# Patient Record
Sex: MALE | Race: WHITE
[De-identification: names, ages, dates, MRNs, and addresses within clinical notes are randomized per-mention and may not be internally consistent; named-entity substitution may affect disease eponyms.]

---

## 2019-03-14 ENCOUNTER — TRANSCRIPTION ENCOUNTER (OUTPATIENT)
Age: 34
End: 2019-03-14

## 2019-11-26 ENCOUNTER — TRANSCRIPTION ENCOUNTER (OUTPATIENT)
Age: 34
End: 2019-11-26

## 2022-08-23 ENCOUNTER — APPOINTMENT (OUTPATIENT)
Age: 37
End: 2022-08-23
Payer: COMMERCIAL

## 2022-08-23 ENCOUNTER — APPOINTMENT (OUTPATIENT)
Dept: BARIATRICS | Facility: CLINIC | Age: 37
End: 2022-08-23

## 2022-08-23 VITALS
TEMPERATURE: 98.5 F | WEIGHT: 235 LBS | HEIGHT: 70 IN | BODY MASS INDEX: 33.64 KG/M2 | DIASTOLIC BLOOD PRESSURE: 89 MMHG | OXYGEN SATURATION: 95 % | HEART RATE: 118 BPM | SYSTOLIC BLOOD PRESSURE: 137 MMHG

## 2022-08-23 DIAGNOSIS — Z78.9 OTHER SPECIFIED HEALTH STATUS: ICD-10-CM

## 2022-08-23 PROBLEM — Z00.00 ENCOUNTER FOR PREVENTIVE HEALTH EXAMINATION: Status: ACTIVE | Noted: 2022-08-23

## 2022-08-23 PROCEDURE — 99204 OFFICE O/P NEW MOD 45 MIN: CPT

## 2022-08-23 PROCEDURE — G0447 BEHAVIOR COUNSEL OBESITY 15M: CPT | Mod: 59

## 2022-08-23 PROCEDURE — 36415 COLL VENOUS BLD VENIPUNCTURE: CPT

## 2022-08-23 PROCEDURE — XXXXX: CPT | Mod: 1L

## 2022-08-24 PROBLEM — Z78.9 NON-SMOKER: Status: ACTIVE | Noted: 2022-08-24

## 2022-08-24 PROBLEM — Z78.9 SOCIAL ALCOHOL USE: Status: ACTIVE | Noted: 2022-08-24

## 2022-08-24 NOTE — ASSESSMENT
[FreeTextEntry1] : Patient with BMI of 33.72. We discussed the importance of weight loss in the management of his comorbidities. We discussed the risks of continued excess weight on long term health. He met with the RD to discuss dietary changes he can make. We discussed ways he can incorporate exercise into his daily routine. \par Will complete labs to r/o secondary causes of weight gain. Pending labs will discuss the use of medication to help supplement lifestyle modification efforts. \par At least 15 minutes was spent during the visit on obesity counseling. \par \par Will wear Apple watch to monitor HR at home. \par \par labs done today in the office, will call with results. \par \par f/u in 4-6 weeks

## 2022-08-24 NOTE — HISTORY OF PRESENT ILLNESS
[Every night] : I use a CPAP machine every night [I usually sleep 6-8 hours] : I usually sleep 6-8 hours [Improved Health] : Improved health [Improved Looks/Aesthetics] : Improved looks/aesthetics [Young Adult] : yound adult [Cut/Track Calories] : cut/track calories [Time management] : time management [Portions/overeating] : portions/overeating [Accountability (having someone/group to report to)] : accountability (having someone/group to report to) [Other: (explain) _____] : [unfilled] [2] : 2 [I snore] : I snore [My sleep partner tells me I stop breathing when I sleep] : My sleep partner tells me I stop breathing when I sleep [2+ miles] : Walking distance capability: 2+ miles [Other: ___] : [unfilled] [0] : 0 [None] : none [BILL] : BILL [Family/friends] : family/friends [FreeTextEntry2] : 165-170 [FreeTextEntry3] : 235 [] : No [FreeTextEntry1] : 36 year old male presents for evaluation of weight gain and obesity. He states that he was generally healthy but began to struggle with his weight in his mid to late 20s. He is currently at his highest weight. At the age of 27 he was about 160-170lbs and felt his best. He feels that in the past when he has tried to lose weight by dieting and exercising, he would become frustrated if he didn't see the weight coming down and would just give up. Currently he eats out most of the week due to his job and social obligations. When he is out to dinner, he also drinks alcohol. He does not have time to exercise. He would like to lose weight to feel better.\par He was diagnosed with BILL and has a CPAP machine he uses every night. He states if he doesn't use the machine he will wake up feeling tired. \par His HR is somewhat elevated today. On repeat was in the 90s. He feels well and denies any chest pain, palpitations or shortness of breath.

## 2022-08-29 LAB
25(OH)D3 SERPL-MCNC: 41.6 NG/ML
ALBUMIN SERPL ELPH-MCNC: 4.8 G/DL
ALP BLD-CCNC: 85 U/L
ALT SERPL-CCNC: 58 U/L
ANION GAP SERPL CALC-SCNC: 17 MMOL/L
AST SERPL-CCNC: 29 U/L
BASOPHILS # BLD AUTO: 0.05 K/UL
BASOPHILS NFR BLD AUTO: 0.5 %
BILIRUB SERPL-MCNC: 0.5 MG/DL
BUN SERPL-MCNC: 15 MG/DL
CALCIUM SERPL-MCNC: 9.7 MG/DL
CHLORIDE SERPL-SCNC: 101 MMOL/L
CHOLEST SERPL-MCNC: 213 MG/DL
CO2 SERPL-SCNC: 24 MMOL/L
CREAT SERPL-MCNC: 0.98 MG/DL
EGFR: 104 ML/MIN/1.73M2
EOSINOPHIL # BLD AUTO: 0.08 K/UL
EOSINOPHIL NFR BLD AUTO: 0.8 %
ESTIMATED AVERAGE GLUCOSE: 108 MG/DL
FOLATE SERPL-MCNC: 6.6 NG/ML
GLUCOSE SERPL-MCNC: 106 MG/DL
HBA1C MFR BLD HPLC: 5.4 %
HCT VFR BLD CALC: 50.5 %
HDLC SERPL-MCNC: 47 MG/DL
HGB BLD-MCNC: 16.9 G/DL
IMM GRANULOCYTES NFR BLD AUTO: 0.9 %
INSULIN P FAST SERPL-ACNC: 28.9 UU/ML
LDLC SERPL CALC-MCNC: NORMAL MG/DL
LYMPHOCYTES # BLD AUTO: 1.93 K/UL
LYMPHOCYTES NFR BLD AUTO: 18.6 %
MAN DIFF?: NORMAL
MCHC RBC-ENTMCNC: 31.7 PG
MCHC RBC-ENTMCNC: 33.5 GM/DL
MCV RBC AUTO: 94.7 FL
MONOCYTES # BLD AUTO: 0.62 K/UL
MONOCYTES NFR BLD AUTO: 6 %
NEUTROPHILS # BLD AUTO: 7.63 K/UL
NEUTROPHILS NFR BLD AUTO: 73.2 %
NONHDLC SERPL-MCNC: 166 MG/DL
PLATELET # BLD AUTO: 249 K/UL
POTASSIUM SERPL-SCNC: 4.1 MMOL/L
PROT SERPL-MCNC: 7.6 G/DL
RBC # BLD: 5.33 M/UL
RBC # FLD: 15.1 %
SODIUM SERPL-SCNC: 142 MMOL/L
T4 FREE SERPL-MCNC: 1.3 NG/DL
T4 SERPL-MCNC: 7.7 UG/DL
TRIGL SERPL-MCNC: 408 MG/DL
TSH SERPL-ACNC: 2.09 UIU/ML
VIT B12 SERPL-MCNC: 833 PG/ML
WBC # FLD AUTO: 10.4 K/UL

## 2022-09-20 ENCOUNTER — APPOINTMENT (OUTPATIENT)
Dept: BARIATRICS | Facility: CLINIC | Age: 37
End: 2022-09-20

## 2022-09-20 VITALS
TEMPERATURE: 98.2 F | BODY MASS INDEX: 32.35 KG/M2 | SYSTOLIC BLOOD PRESSURE: 127 MMHG | WEIGHT: 226 LBS | OXYGEN SATURATION: 98 % | DIASTOLIC BLOOD PRESSURE: 77 MMHG | HEART RATE: 78 BPM | HEIGHT: 70 IN

## 2022-09-20 PROCEDURE — 36415 COLL VENOUS BLD VENIPUNCTURE: CPT

## 2022-09-20 PROCEDURE — G0447 BEHAVIOR COUNSEL OBESITY 15M: CPT | Mod: 59

## 2022-09-20 PROCEDURE — 99214 OFFICE O/P EST MOD 30 MIN: CPT

## 2022-09-21 NOTE — ASSESSMENT
[FreeTextEntry1] : Patient with BMI of 32.43. Positive reinforcement offered for patients efforts and success. He will continue to focus on lifestyle modifications. Fasting labs done today in the office. Pending labs consider adding GLP1 to help with metabolic syndrome. \par He met with the RD today (see attached notes). \par At least 15 minutes was spent during the visit on obesity counseling. \par \par Will continue to monitor BP and HR.\par \par Labs done to check Lipid panel due to Trigs >400. \par \par f/u in 6 weeks.

## 2022-09-21 NOTE — HISTORY OF PRESENT ILLNESS
[FreeTextEntry1] : 36 year old male presents for follow up of weight gain and obesity. He states that he was generally healthy but began to struggle with his weight in his mid to late 20s. He is currently at his highest weight. At the age of 27 he was about 160-170lbs and felt his best. He feels that in the past when he has tried to lose weight by dieting and exercising, he would become frustrated if he didn't see the weight coming down and would just give up. Currently he eats out most of the week due to his job and social obligations. When he is out to dinner, he also drinks alcohol. He does not have time to exercise. He would like to lose weight to feel better.\par He was diagnosed with BILL and has a CPAP machine he uses every night. He states if he doesn't use the machine he will wake up feeling tired. \par \par Since his last visit, he has made significant changes to his diet. He has cut down on alcohol. He is exercising now.  He is now taking Metformin 500 mg BID to help with IFG and Insulin resistance. He is also taking Omega 3 fish oil for hypertriglyceridemia. Mr. Painter has lost 9 lbs since last visit. \par BP and HR are much improved today.

## 2022-09-22 LAB
ALBUMIN SERPL ELPH-MCNC: 4.5 G/DL
ALP BLD-CCNC: 60 U/L
ALT SERPL-CCNC: 42 U/L
ANION GAP SERPL CALC-SCNC: 13 MMOL/L
AST SERPL-CCNC: 29 U/L
BILIRUB SERPL-MCNC: 0.5 MG/DL
BUN SERPL-MCNC: 15 MG/DL
CALCIUM SERPL-MCNC: 9.3 MG/DL
CHLORIDE SERPL-SCNC: 103 MMOL/L
CHOLEST SERPL-MCNC: 205 MG/DL
CO2 SERPL-SCNC: 24 MMOL/L
CREAT SERPL-MCNC: 1 MG/DL
EGFR: 101 ML/MIN/1.73M2
GLUCOSE SERPL-MCNC: 84 MG/DL
HDLC SERPL-MCNC: 40 MG/DL
INSULIN P FAST SERPL-ACNC: 5.9 UU/ML
LDLC SERPL CALC-MCNC: 141 MG/DL
LDLC SERPL DIRECT ASSAY-MCNC: 134 MG/DL
NONHDLC SERPL-MCNC: 166 MG/DL
POTASSIUM SERPL-SCNC: 4.2 MMOL/L
PROT SERPL-MCNC: 7.1 G/DL
SODIUM SERPL-SCNC: 140 MMOL/L
TRIGL SERPL-MCNC: 125 MG/DL

## 2022-11-01 ENCOUNTER — APPOINTMENT (OUTPATIENT)
Dept: BARIATRICS | Facility: CLINIC | Age: 37
End: 2022-11-01

## 2022-11-01 VITALS
DIASTOLIC BLOOD PRESSURE: 87 MMHG | HEART RATE: 76 BPM | HEIGHT: 70 IN | SYSTOLIC BLOOD PRESSURE: 129 MMHG | OXYGEN SATURATION: 98 % | TEMPERATURE: 98.2 F | BODY MASS INDEX: 29.92 KG/M2 | WEIGHT: 209 LBS

## 2022-11-01 PROCEDURE — G0447 BEHAVIOR COUNSEL OBESITY 15M: CPT | Mod: 59

## 2022-11-01 PROCEDURE — 99213 OFFICE O/P EST LOW 20 MIN: CPT

## 2022-11-01 NOTE — HISTORY OF PRESENT ILLNESS
[FreeTextEntry1] : 36 year old male presents for follow up of weight gain and obesity. He states that he was generally healthy but began to struggle with his weight in his mid to late 20s. He is currently at his highest weight. At the age of 27 he was about 160-170lbs and felt his best. He feels that in the past when he has tried to lose weight by dieting and exercising, he would become frustrated if he didn't see the weight coming down and would just give up. Currently he eats out most of the week due to his job and social obligations. When he is out to dinner, he also drinks alcohol. He does not have time to exercise. He would like to lose weight to feel better.\par He was diagnosed with BILL and has a CPAP machine he uses every night. He states if he doesn't use the machine he will wake up feeling tired. \par \par Since his last visit, he has made significant changes to his diet. He has cut down on alcohol. He is exercising now.  He is now taking Metformin 500 mg BID to help with IFG and Insulin resistance. And recently started on Mounjaro 2.5 mg weekly. Feels well and denies any GI side effects. He is also taking Omega 3 fish oil for hypertriglyceridemia. Mr. Painter has lost 17 lbs since last visit. Has lost a total of 26 lbs.\par BP and HR are much improved today.

## 2022-11-01 NOTE — ASSESSMENT
[FreeTextEntry1] : Patient with improving BMI. Positive reinforcement offered for patients efforts and success. He will continue to focus on lifestyle modifications. He will continue the same medications. Discussed that weight loss may start to slow down. Discussed importance of making sure he also includes resistance training to prevent muscle loss.\par \par At least 15 minutes was spent during the visit on obesity counseling. \par \par Will continue to monitor BP and HR.\par \par Patient will complete fasting labs prior to f/u in 2 months\par \par Will call for any issues.

## 2022-12-27 ENCOUNTER — RX RENEWAL (OUTPATIENT)
Age: 37
End: 2022-12-27

## 2023-01-13 ENCOUNTER — APPOINTMENT (OUTPATIENT)
Dept: BARIATRICS | Facility: CLINIC | Age: 38
End: 2023-01-13
Payer: COMMERCIAL

## 2023-01-13 VITALS
SYSTOLIC BLOOD PRESSURE: 124 MMHG | BODY MASS INDEX: 27.77 KG/M2 | DIASTOLIC BLOOD PRESSURE: 79 MMHG | HEIGHT: 70 IN | HEART RATE: 74 BPM | WEIGHT: 194 LBS | TEMPERATURE: 97.6 F | OXYGEN SATURATION: 99 %

## 2023-01-13 DIAGNOSIS — E78.2 MIXED HYPERLIPIDEMIA: ICD-10-CM

## 2023-01-13 PROCEDURE — 99214 OFFICE O/P EST MOD 30 MIN: CPT

## 2023-01-13 PROCEDURE — G0447 BEHAVIOR COUNSEL OBESITY 15M: CPT | Mod: 59

## 2023-01-13 PROCEDURE — 36415 COLL VENOUS BLD VENIPUNCTURE: CPT

## 2023-01-15 PROBLEM — E78.2 ELEVATED TRIGLYCERIDES WITH HIGH CHOLESTEROL: Status: ACTIVE | Noted: 2022-08-29

## 2023-01-15 NOTE — ASSESSMENT
[FreeTextEntry1] : Patient with improving BMI. Positive reinforcement offered for patients efforts and success. He will continue to focus on lifestyle modifications. He will continue the same medications. Discussed that weight loss may start to slow down.\par \par At least 15 minutes was spent during the visit on obesity counseling. \par \par Will continue to monitor BP and HR.\par \par Fasting labs done today in the office, will call with results. \par \par Will call for any issues.

## 2023-01-15 NOTE — HISTORY OF PRESENT ILLNESS
[FreeTextEntry1] : 36 year old male presents for follow up of weight gain and obesity. He states that he was generally healthy but began to struggle with his weight in his mid to late 20s. He is currently at his highest weight. At the age of 27 he was about 160-170lbs and felt his best. He feels that in the past when he has tried to lose weight by dieting and exercising, he would become frustrated if he didn't see the weight coming down and would just give up. Currently he eats out most of the week due to his job and social obligations. When he is out to dinner, he also drinks alcohol. He does not have time to exercise. He would like to lose weight to feel better.\par He was diagnosed with BILL and has a CPAP machine he uses every night. He states if he doesn't use the machine he will wake up feeling tired. \par \par Since his last visit, he has made significant changes to his diet. He has cut down on alcohol. He is exercising now.  He is now taking Metformin 500 mg BID and Mounjaro 2.5 mg weekly. Feels well and denies any GI side effects. He is also taking Omega 3 fish oil for hypertriglyceridemia. Mr. Painter has lost 15 lbs since last visit. Has lost a total of 40 lbs.\par BP and HR are much improved today.

## 2023-01-18 LAB
ALBUMIN SERPL ELPH-MCNC: 4.4 G/DL
ALP BLD-CCNC: 57 U/L
ALT SERPL-CCNC: 23 U/L
ANION GAP SERPL CALC-SCNC: 11 MMOL/L
AST SERPL-CCNC: 20 U/L
BASOPHILS # BLD AUTO: 0.03 K/UL
BASOPHILS NFR BLD AUTO: 0.5 %
BILIRUB SERPL-MCNC: 0.5 MG/DL
BUN SERPL-MCNC: 19 MG/DL
CALCIUM SERPL-MCNC: 9.5 MG/DL
CHLORIDE SERPL-SCNC: 103 MMOL/L
CHOLEST SERPL-MCNC: 149 MG/DL
CO2 SERPL-SCNC: 27 MMOL/L
CREAT SERPL-MCNC: 0.98 MG/DL
EGFR: 103 ML/MIN/1.73M2
EOSINOPHIL # BLD AUTO: 0.04 K/UL
EOSINOPHIL NFR BLD AUTO: 0.7 %
ESTIMATED AVERAGE GLUCOSE: 85 MG/DL
GLUCOSE SERPL-MCNC: 91 MG/DL
HBA1C MFR BLD HPLC: 4.6 %
HCT VFR BLD CALC: 47.2 %
HDLC SERPL-MCNC: 51 MG/DL
HGB BLD-MCNC: 16.4 G/DL
IMM GRANULOCYTES NFR BLD AUTO: 0.5 %
LDLC SERPL CALC-MCNC: 84 MG/DL
LDLC SERPL DIRECT ASSAY-MCNC: 84 MG/DL
LYMPHOCYTES # BLD AUTO: 1.33 K/UL
LYMPHOCYTES NFR BLD AUTO: 23 %
MAN DIFF?: NORMAL
MCHC RBC-ENTMCNC: 32 PG
MCHC RBC-ENTMCNC: 34.7 GM/DL
MCV RBC AUTO: 92 FL
MONOCYTES # BLD AUTO: 0.47 K/UL
MONOCYTES NFR BLD AUTO: 8.1 %
NEUTROPHILS # BLD AUTO: 3.89 K/UL
NEUTROPHILS NFR BLD AUTO: 67.2 %
NONHDLC SERPL-MCNC: 99 MG/DL
PLATELET # BLD AUTO: 218 K/UL
POTASSIUM SERPL-SCNC: 4.5 MMOL/L
PROT SERPL-MCNC: 6.9 G/DL
RBC # BLD: 5.13 M/UL
RBC # FLD: 13.3 %
SODIUM SERPL-SCNC: 141 MMOL/L
T4 FREE SERPL-MCNC: 1.3 NG/DL
T4 SERPL-MCNC: 6.4 UG/DL
TRIGL SERPL-MCNC: 75 MG/DL
TSH SERPL-ACNC: 1.05 UIU/ML
WBC # FLD AUTO: 5.79 K/UL

## 2023-02-27 ENCOUNTER — RX RENEWAL (OUTPATIENT)
Age: 38
End: 2023-02-27

## 2023-03-17 ENCOUNTER — APPOINTMENT (OUTPATIENT)
Dept: BARIATRICS/WEIGHT MGMT | Facility: CLINIC | Age: 38
End: 2023-03-17
Payer: COMMERCIAL

## 2023-03-17 ENCOUNTER — APPOINTMENT (OUTPATIENT)
Dept: BARIATRICS | Facility: CLINIC | Age: 38
End: 2023-03-17

## 2023-03-17 VITALS
TEMPERATURE: 97.9 F | SYSTOLIC BLOOD PRESSURE: 121 MMHG | BODY MASS INDEX: 27.49 KG/M2 | DIASTOLIC BLOOD PRESSURE: 76 MMHG | WEIGHT: 192 LBS | HEART RATE: 74 BPM | OXYGEN SATURATION: 98 % | HEIGHT: 70 IN

## 2023-03-17 DIAGNOSIS — G47.30 SLEEP APNEA, UNSPECIFIED: ICD-10-CM

## 2023-03-17 DIAGNOSIS — R73.01 IMPAIRED FASTING GLUCOSE: ICD-10-CM

## 2023-03-17 DIAGNOSIS — E88.81 METABOLIC SYNDROME: ICD-10-CM

## 2023-03-17 PROCEDURE — G0447 BEHAVIOR COUNSEL OBESITY 15M: CPT | Mod: 59

## 2023-03-17 PROCEDURE — 99213 OFFICE O/P EST LOW 20 MIN: CPT

## 2023-03-17 NOTE — HISTORY OF PRESENT ILLNESS
[FreeTextEntry1] : 36 year old male presents for follow up of weight gain and obesity. He states that he was generally healthy but began to struggle with his weight in his mid to late 20s. He is currently at his highest weight. At the age of 27 he was about 160-170lbs and felt his best. He feels that in the past when he has tried to lose weight by dieting and exercising, he would become frustrated if he didn't see the weight coming down and would just give up. Currently he eats out most of the week due to his job and social obligations. When he is out to dinner, he also drinks alcohol. He does not have time to exercise. He would like to lose weight to feel better.\par He was diagnosed with BILL and has a CPAP machine he uses every night. He states if he doesn't use the machine he will wake up feeling tired. \par \par Since his last visit, he increased his dose of MOunjaro to 5 mg weekly. He continues to take Metformin 500 mg BID. He continues to follow a healthy lifestyle. Has not been able to exercise since his last visit. \par He is also taking Omega 3 fish oil for hypertriglyceridemia. Mr. Painter has lost 2 lbs since last visit. Has lost a total of 40+ lbs.\par BP and HR are much improved today.

## 2023-03-17 NOTE — ASSESSMENT
[FreeTextEntry1] : Patient with improving BMI. Positive reinforcement offered for patients efforts and success. He will continue to focus on lifestyle modifications. Importance of exercise especially strength training for long term weight loss discussed. He will resume exercising. He will continue the same medications.\par \par At least 15 minutes was spent during the visit on obesity counseling. \par \par Will continue to monitor BP and HR.\par \par Will call for any issues. \par \par f/u in 2 months

## 2023-03-27 ENCOUNTER — RX RENEWAL (OUTPATIENT)
Age: 38
End: 2023-03-27

## 2023-03-27 RX ORDER — OMEGA-3-ACID ETHYL ESTERS CAPSULES 1 G/1
1 CAPSULE, LIQUID FILLED ORAL
Qty: 60 | Refills: 2 | Status: ACTIVE | COMMUNITY
Start: 2022-08-29 | End: 1900-01-01

## 2023-05-19 ENCOUNTER — RX RENEWAL (OUTPATIENT)
Age: 38
End: 2023-05-19

## 2023-05-19 ENCOUNTER — APPOINTMENT (OUTPATIENT)
Dept: BARIATRICS/WEIGHT MGMT | Facility: CLINIC | Age: 38
End: 2023-05-19

## 2023-05-19 ENCOUNTER — APPOINTMENT (OUTPATIENT)
Age: 38
End: 2023-05-19

## 2023-05-19 ENCOUNTER — APPOINTMENT (OUTPATIENT)
Dept: BARIATRICS | Facility: CLINIC | Age: 38
End: 2023-05-19

## 2023-06-07 ENCOUNTER — RX RENEWAL (OUTPATIENT)
Age: 38
End: 2023-06-07

## 2023-06-16 ENCOUNTER — NON-APPOINTMENT (OUTPATIENT)
Age: 38
End: 2023-06-16

## 2023-06-19 ENCOUNTER — RX RENEWAL (OUTPATIENT)
Age: 38
End: 2023-06-19

## 2023-07-12 ENCOUNTER — NON-APPOINTMENT (OUTPATIENT)
Age: 38
End: 2023-07-12

## 2024-04-02 ENCOUNTER — APPOINTMENT (OUTPATIENT)
Dept: INTERNAL MEDICINE | Facility: CLINIC | Age: 39
End: 2024-04-02

## 2024-06-07 ENCOUNTER — APPOINTMENT (OUTPATIENT)
Dept: ENDOCRINOLOGY | Facility: CLINIC | Age: 39
End: 2024-06-07
Payer: COMMERCIAL

## 2024-06-07 VITALS
SYSTOLIC BLOOD PRESSURE: 130 MMHG | TEMPERATURE: 97.6 F | RESPIRATION RATE: 16 BRPM | OXYGEN SATURATION: 100 % | WEIGHT: 223 LBS | DIASTOLIC BLOOD PRESSURE: 70 MMHG | HEIGHT: 70 IN | BODY MASS INDEX: 31.92 KG/M2 | HEART RATE: 72 BPM

## 2024-06-07 LAB — GLUCOSE BLDC GLUCOMTR-MCNC: 124

## 2024-06-07 PROCEDURE — 82962 GLUCOSE BLOOD TEST: CPT

## 2024-06-07 PROCEDURE — 99213 OFFICE O/P EST LOW 20 MIN: CPT | Mod: 25

## 2024-06-07 NOTE — HISTORY OF PRESENT ILLNESS
[FreeTextEntry1] : ***JUNE 7 ,2024***  Pt has gained 30 lbs feels low energy he reports being off medications for 3 months.  He was previously on Mounjaro 7.5mg qwk initially felt great on it but the effect wore off and he began gaining weight, also took metformin 500 mg BID.  He denies ever having had side effects on Mounjaro or metformin denies constipation, nausea, vomiting abdominal discomfort. BMI 32 223 lbs

## 2024-06-07 NOTE — DATA REVIEWED
[FreeTextEntry1] : Constitutional: Denies excessive fatigue. Denies significant weight changes  Skin: Denies diaphoresis or sweating  HEENT: Denies vision changes, eye protrusion/dryness  Endocrine: No cold intolerance. No heat intolerance  CV: Denies chest pain, palpitations or sensation of heart racing  Pulmonary: No SOB  GI: Denies diarrhea or constipation, nausea/ vomiting  Neurological: Denies numbness, paresthesias, tremor  Neuropsychiatric: Denies anxiety  Musculoskeletal: Denies new joint pain, muscle aches or proximal muscle weakness. No intermittent claudication

## 2024-06-07 NOTE — ASSESSMENT
[FreeTextEntry1] : BMI 32 Weight gain with fatigue -Resume Metformin 500 mg BID -Resume Mounjaro 2.5 mg qwk and uptitrate as directed -F/U with S[facility] Nicole and [facility] NP Manjula asap -email provided for direct provider access rtc as needed

## 2024-06-11 RX ORDER — METFORMIN ER 500 MG 500 MG/1
500 TABLET ORAL
Qty: 60 | Refills: 6 | Status: ACTIVE | COMMUNITY
Start: 2022-08-29 | End: 1900-01-01

## 2024-06-15 DIAGNOSIS — E66.9 OBESITY, UNSPECIFIED: ICD-10-CM

## 2024-06-19 RX ORDER — TIRZEPATIDE 2.5 MG/.5ML
2.5 INJECTION, SOLUTION SUBCUTANEOUS
Qty: 1 | Refills: 1 | Status: ACTIVE | COMMUNITY
Start: 2022-10-18

## 2024-09-11 ENCOUNTER — NON-APPOINTMENT (OUTPATIENT)
Age: 39
End: 2024-09-11

## 2024-09-12 ENCOUNTER — NON-APPOINTMENT (OUTPATIENT)
Age: 39
End: 2024-09-12

## 2024-09-12 ENCOUNTER — APPOINTMENT (OUTPATIENT)
Dept: INTERNAL MEDICINE | Facility: CLINIC | Age: 39
End: 2024-09-12
Payer: COMMERCIAL

## 2024-09-12 VITALS
HEART RATE: 94 BPM | RESPIRATION RATE: 16 BRPM | OXYGEN SATURATION: 96 % | BODY MASS INDEX: 31.78 KG/M2 | WEIGHT: 222 LBS | SYSTOLIC BLOOD PRESSURE: 118 MMHG | HEIGHT: 70 IN | DIASTOLIC BLOOD PRESSURE: 74 MMHG | TEMPERATURE: 98.2 F

## 2024-09-12 DIAGNOSIS — Z00.00 ENCOUNTER FOR GENERAL ADULT MEDICAL EXAMINATION W/OUT ABNORMAL FINDINGS: ICD-10-CM

## 2024-09-12 DIAGNOSIS — E66.9 OBESITY, UNSPECIFIED: ICD-10-CM

## 2024-09-12 DIAGNOSIS — Z80.8 FAMILY HISTORY OF MALIGNANT NEOPLASM OF OTHER ORGANS OR SYSTEMS: ICD-10-CM

## 2024-09-12 PROCEDURE — 93000 ELECTROCARDIOGRAM COMPLETE: CPT

## 2024-09-12 PROCEDURE — 99385 PREV VISIT NEW AGE 18-39: CPT

## 2024-09-12 PROCEDURE — 36415 COLL VENOUS BLD VENIPUNCTURE: CPT

## 2024-09-12 NOTE — HISTORY OF PRESENT ILLNESS
[FreeTextEntry1] : cpx [de-identified] : off mounjaro 6 months--ins stopped covering prior on cpap--denies current sleep apnea symps reviewed prior notes no meds volunteer

## 2024-09-13 LAB
ALBUMIN SERPL ELPH-MCNC: 4.7 G/DL
ALP BLD-CCNC: 79 U/L
ALT SERPL-CCNC: 47 U/L
ANION GAP SERPL CALC-SCNC: 13 MMOL/L
APPEARANCE: CLEAR
AST SERPL-CCNC: 28 U/L
BACTERIA: NEGATIVE /HPF
BASOPHILS # BLD AUTO: 0.04 K/UL
BASOPHILS NFR BLD AUTO: 0.5 %
BILIRUB SERPL-MCNC: 0.7 MG/DL
BILIRUBIN URINE: NEGATIVE
BLOOD URINE: NEGATIVE
BUN SERPL-MCNC: 16 MG/DL
CALCIUM SERPL-MCNC: 9.7 MG/DL
CAST: 1 /LPF
CHLORIDE SERPL-SCNC: 99 MMOL/L
CHOLEST SERPL-MCNC: 194 MG/DL
CO2 SERPL-SCNC: 28 MMOL/L
COLOR: NORMAL
CREAT SERPL-MCNC: 1.11 MG/DL
EGFR: 88 ML/MIN/1.73M2
EOSINOPHIL # BLD AUTO: 0.08 K/UL
EOSINOPHIL NFR BLD AUTO: 1.1 %
EPITHELIAL CELLS: 0 /HPF
ESTIMATED AVERAGE GLUCOSE: 100 MG/DL
GLUCOSE QUALITATIVE U: NEGATIVE MG/DL
GLUCOSE SERPL-MCNC: 81 MG/DL
HBA1C MFR BLD HPLC: 5.1 %
HCT VFR BLD CALC: 50.2 %
HDLC SERPL-MCNC: 49 MG/DL
HGB BLD-MCNC: 16.4 G/DL
IMM GRANULOCYTES NFR BLD AUTO: 0.7 %
KETONES URINE: 15 MG/DL
LDLC SERPL CALC-MCNC: 124 MG/DL
LEUKOCYTE ESTERASE URINE: NEGATIVE
LYMPHOCYTES # BLD AUTO: 1.94 K/UL
LYMPHOCYTES NFR BLD AUTO: 26.6 %
MAN DIFF?: NORMAL
MCHC RBC-ENTMCNC: 31.6 PG
MCHC RBC-ENTMCNC: 32.7 GM/DL
MCV RBC AUTO: 96.7 FL
MICROSCOPIC-UA: NORMAL
MONOCYTES # BLD AUTO: 0.6 K/UL
MONOCYTES NFR BLD AUTO: 8.2 %
NEUTROPHILS # BLD AUTO: 4.59 K/UL
NEUTROPHILS NFR BLD AUTO: 62.9 %
NITRITE URINE: NEGATIVE
NONHDLC SERPL-MCNC: 145 MG/DL
PH URINE: 6.5
PLATELET # BLD AUTO: 238 K/UL
POTASSIUM SERPL-SCNC: 4.2 MMOL/L
PROT SERPL-MCNC: 7.5 G/DL
PROTEIN URINE: NORMAL MG/DL
RBC # BLD: 5.19 M/UL
RBC # FLD: 12.6 %
RED BLOOD CELLS URINE: 0 /HPF
SODIUM SERPL-SCNC: 139 MMOL/L
SPECIFIC GRAVITY URINE: 1.02
TRIGL SERPL-MCNC: 115 MG/DL
TSH SERPL-ACNC: 1.38 UIU/ML
UROBILINOGEN URINE: 1 MG/DL
WBC # FLD AUTO: 7.3 K/UL
WHITE BLOOD CELLS URINE: 0 /HPF

## 2024-11-14 ENCOUNTER — TRANSCRIPTION ENCOUNTER (OUTPATIENT)
Age: 39
End: 2024-11-14

## 2024-11-14 ENCOUNTER — APPOINTMENT (OUTPATIENT)
Dept: INTERNAL MEDICINE | Facility: CLINIC | Age: 39
End: 2024-11-14
Payer: COMMERCIAL

## 2024-11-14 VITALS
OXYGEN SATURATION: 96 % | BODY MASS INDEX: 30.21 KG/M2 | TEMPERATURE: 98.4 F | HEIGHT: 70.16 IN | WEIGHT: 211 LBS | DIASTOLIC BLOOD PRESSURE: 82 MMHG | HEART RATE: 98 BPM | SYSTOLIC BLOOD PRESSURE: 121 MMHG

## 2024-11-14 DIAGNOSIS — R09.81 NASAL CONGESTION: ICD-10-CM

## 2024-11-14 DIAGNOSIS — R53.83 OTHER MALAISE: ICD-10-CM

## 2024-11-14 DIAGNOSIS — R53.81 OTHER MALAISE: ICD-10-CM

## 2024-11-14 PROCEDURE — G2211 COMPLEX E/M VISIT ADD ON: CPT | Mod: NC

## 2024-11-14 PROCEDURE — 99213 OFFICE O/P EST LOW 20 MIN: CPT

## 2024-11-15 RX ORDER — AZITHROMYCIN 250 MG/1
250 TABLET, FILM COATED ORAL
Qty: 1 | Refills: 0 | Status: ACTIVE | COMMUNITY
Start: 2024-11-14 | End: 1900-01-01

## 2024-11-15 RX ORDER — PREDNISONE 20 MG/1
20 TABLET ORAL
Qty: 10 | Refills: 0 | Status: ACTIVE | COMMUNITY
Start: 2024-11-14 | End: 1900-01-01

## 2024-11-16 LAB — TESTOST SERPL-MCNC: 190 NG/DL

## 2024-11-18 ENCOUNTER — TRANSCRIPTION ENCOUNTER (OUTPATIENT)
Age: 39
End: 2024-11-18

## 2024-11-19 ENCOUNTER — RX RENEWAL (OUTPATIENT)
Age: 39
End: 2024-11-19

## 2024-11-25 ENCOUNTER — TRANSCRIPTION ENCOUNTER (OUTPATIENT)
Age: 39
End: 2024-11-25

## 2024-11-25 LAB
FSH SERPL-MCNC: 3 IU/L
LH SERPL-ACNC: 3 IU/L
PROLACTIN SERPL-MCNC: 17.6 NG/ML
SHBG SERPL-SCNC: 22.2 NMOL/L
TESTOST SERPL-MCNC: 296 NG/DL

## 2025-03-21 ENCOUNTER — APPOINTMENT (OUTPATIENT)
Dept: BARIATRICS | Facility: CLINIC | Age: 40
End: 2025-03-21
Payer: COMMERCIAL

## 2025-03-21 VITALS — BODY MASS INDEX: 29.28 KG/M2 | WEIGHT: 205 LBS

## 2025-03-21 DIAGNOSIS — G47.30 SLEEP APNEA, UNSPECIFIED: ICD-10-CM

## 2025-03-21 DIAGNOSIS — E66.9 OBESITY, UNSPECIFIED: ICD-10-CM

## 2025-03-21 DIAGNOSIS — E78.2 MIXED HYPERLIPIDEMIA: ICD-10-CM

## 2025-03-21 DIAGNOSIS — E88.819 INSULIN RESISTANCE, UNSPECIFIED: ICD-10-CM

## 2025-03-21 PROCEDURE — G0447 BEHAVIOR COUNSEL OBESITY 15M: CPT | Mod: 95,59

## 2025-03-21 PROCEDURE — 99214 OFFICE O/P EST MOD 30 MIN: CPT | Mod: 95

## 2025-03-28 LAB
25(OH)D3 SERPL-MCNC: 25.2 NG/ML
ALBUMIN SERPL ELPH-MCNC: 4.6 G/DL
ALP BLD-CCNC: 95 U/L
ALT SERPL-CCNC: 42 U/L
ANION GAP SERPL CALC-SCNC: 15 MMOL/L
AST SERPL-CCNC: 27 U/L
BILIRUB SERPL-MCNC: 0.9 MG/DL
BUN SERPL-MCNC: 20 MG/DL
CALCIUM SERPL-MCNC: 9.4 MG/DL
CHLORIDE SERPL-SCNC: 101 MMOL/L
CHOLEST SERPL-MCNC: 215 MG/DL
CO2 SERPL-SCNC: 25 MMOL/L
CREAT SERPL-MCNC: 1.06 MG/DL
EGFRCR SERPLBLD CKD-EPI 2021: 93 ML/MIN/1.73M2
ESTIMATED AVERAGE GLUCOSE: 103 MG/DL
FOLATE SERPL-MCNC: 13.9 NG/ML
GLUCOSE SERPL-MCNC: 80 MG/DL
HBA1C MFR BLD HPLC: 5.2 %
HDLC SERPL-MCNC: 49 MG/DL
LDLC SERPL-MCNC: 147 MG/DL
NONHDLC SERPL-MCNC: 166 MG/DL
POTASSIUM SERPL-SCNC: 4 MMOL/L
PROT SERPL-MCNC: 7.5 G/DL
SODIUM SERPL-SCNC: 141 MMOL/L
T4 FREE SERPL-MCNC: 1.3 NG/DL
TRIGL SERPL-MCNC: 102 MG/DL
TSH SERPL-ACNC: 0.88 UIU/ML
VIT B12 SERPL-MCNC: 624 PG/ML

## 2025-03-28 RX ORDER — TIRZEPATIDE 10 MG/.5ML
10 INJECTION, SOLUTION SUBCUTANEOUS
Qty: 1 | Refills: 2 | Status: ACTIVE | COMMUNITY
Start: 2025-03-21

## 2025-04-02 ENCOUNTER — APPOINTMENT (OUTPATIENT)
Dept: BARIATRICS | Facility: CLINIC | Age: 40
End: 2025-04-02

## 2025-06-03 ENCOUNTER — RX RENEWAL (OUTPATIENT)
Age: 40
End: 2025-06-03

## 2025-07-16 ENCOUNTER — NON-APPOINTMENT (OUTPATIENT)
Age: 40
End: 2025-07-16

## 2025-07-16 ENCOUNTER — APPOINTMENT (OUTPATIENT)
Dept: BARIATRICS | Facility: CLINIC | Age: 40
End: 2025-07-16
Payer: COMMERCIAL

## 2025-07-16 VITALS
BODY MASS INDEX: 29.92 KG/M2 | HEIGHT: 70.16 IN | SYSTOLIC BLOOD PRESSURE: 133 MMHG | WEIGHT: 209 LBS | DIASTOLIC BLOOD PRESSURE: 79 MMHG | TEMPERATURE: 97.6 F | HEART RATE: 69 BPM

## 2025-07-16 PROCEDURE — 99214 OFFICE O/P EST MOD 30 MIN: CPT

## 2025-07-16 PROCEDURE — G0447 BEHAVIOR COUNSEL OBESITY 15M: CPT | Mod: 59

## 2025-07-16 PROCEDURE — 36415 COLL VENOUS BLD VENIPUNCTURE: CPT

## 2025-07-22 LAB
ANION GAP SERPL CALC-SCNC: 16 MMOL/L
BUN SERPL-MCNC: 15 MG/DL
CALCIUM SERPL-MCNC: 9.5 MG/DL
CHLORIDE SERPL-SCNC: 102 MMOL/L
CHOLEST SERPL-MCNC: 212 MG/DL
CO2 SERPL-SCNC: 23 MMOL/L
CREAT SERPL-MCNC: 1 MG/DL
EGFRCR SERPLBLD CKD-EPI 2021: 99 ML/MIN/1.73M2
GLUCOSE SERPL-MCNC: 82 MG/DL
HDLC SERPL-MCNC: 49 MG/DL
LDLC SERPL-MCNC: 130 MG/DL
NONHDLC SERPL-MCNC: 163 MG/DL
POTASSIUM SERPL-SCNC: 4.5 MMOL/L
SODIUM SERPL-SCNC: 141 MMOL/L
TRIGL SERPL-MCNC: 187 MG/DL

## 2025-08-05 ENCOUNTER — RX RENEWAL (OUTPATIENT)
Age: 40
End: 2025-08-05